# Patient Record
Sex: FEMALE | Race: WHITE
[De-identification: names, ages, dates, MRNs, and addresses within clinical notes are randomized per-mention and may not be internally consistent; named-entity substitution may affect disease eponyms.]

---

## 2018-06-29 ENCOUNTER — HOSPITAL ENCOUNTER (EMERGENCY)
Dept: HOSPITAL 89 - ER | Age: 23
Discharge: HOME | End: 2018-06-29
Payer: COMMERCIAL

## 2018-06-29 VITALS — SYSTOLIC BLOOD PRESSURE: 117 MMHG | DIASTOLIC BLOOD PRESSURE: 86 MMHG

## 2018-06-29 DIAGNOSIS — M54.12: Primary | ICD-10-CM

## 2018-06-29 PROCEDURE — 82947 ASSAY GLUCOSE BLOOD QUANT: CPT

## 2018-06-29 PROCEDURE — 82310 ASSAY OF CALCIUM: CPT

## 2018-06-29 PROCEDURE — 84295 ASSAY OF SERUM SODIUM: CPT

## 2018-06-29 PROCEDURE — 84132 ASSAY OF SERUM POTASSIUM: CPT

## 2018-06-29 PROCEDURE — 99283 EMERGENCY DEPT VISIT LOW MDM: CPT

## 2018-06-29 PROCEDURE — 84520 ASSAY OF UREA NITROGEN: CPT

## 2018-06-29 PROCEDURE — 82565 ASSAY OF CREATININE: CPT

## 2018-06-29 PROCEDURE — 72040 X-RAY EXAM NECK SPINE 2-3 VW: CPT

## 2018-06-29 PROCEDURE — 82374 ASSAY BLOOD CARBON DIOXIDE: CPT

## 2018-06-29 PROCEDURE — 85379 FIBRIN DEGRADATION QUANT: CPT

## 2018-06-29 PROCEDURE — 82435 ASSAY OF BLOOD CHLORIDE: CPT

## 2018-06-29 PROCEDURE — 36415 COLL VENOUS BLD VENIPUNCTURE: CPT

## 2018-06-29 NOTE — ER REPORT
History and Physical


Time Seen By MD:  15:44


Hx. of Stated Complaint:  


NUMBNESS ALL OVER, MOSTLY RIGHT SIDE


HPI/ROS


CHIEF COMPLAINT: r arm numbness and neck pain





HISTORY OF PRESENT ILLNESS: Pt woke up with pain in her neck a few days ago.  

Pt soon after started with r arm numbness and tingling.  Pt denies any 

weakness. Has had an occasional headache but not currently. no hx of migraines 

for self but there is a family hx with mom.  Pt used motrin with minimal 

relief. spoke to her mom who was concerned about possible blood clot in the arm 

causing the symptoms since family hx of blood clots.  Pt has had no trauma. pt 

has no arm swelling.  pt has full range of motion.  came in to be checked for 

possible blood clot. no cp or sob. no leg weakness or numbness





REVIEW OF SYSTEMS:


Constitutional: No fever, no chills.


Eyes: No discharge.


ENT: No sore throat. 


Cardiovascular: No chest pain, no palpitations.


Respiratory: No cough, no shortness of breath.


Gastrointestinal: No abdominal pain, no vomiting.


Genitourinary: No hematuria.


Musculoskeletal: No back pain, + neck pain


Skin: No rashes.


Neurological: occasional headache, + r arm tingling


Allergies:  


Coded Allergies:  


     No Known Drug Allergies (Unverified , 6/29/18)


Home Meds


No Active Prescriptions or Reported Meds


Past Medical/Surgical History


Pmhx and pshx denies


Reviewed Nurses Notes:  Yes


Old Medical Records Reviewed:  Yes


Hx Smoking:  No


Hx Substance Use Disorder:  No


Hx Alcohol Use:  No


Constitutional





Vital Sign - Last 24 Hours








 6/29/18 6/29/18 6/29/18 6/29/18





 15:49 15:50 15:56 16:00


 


Temp  97.1  


 


Pulse  81 79 


 


Resp  14 12 


 


B/P (MAP) 149/102 (118) 149/102  123/75 (91)


 


Pulse Ox  100  


 


O2 Delivery  Room Air  


 


    





 6/29/18 6/29/18 6/29/18 6/29/18





 16:11 16:26 16:30 16:39


 


Pulse ??? 70  


 


Resp  10  


 


B/P (MAP)   118/88 (98) 117/86 (96)


 


Pulse Ox  97  








Physical Exam


General Appearance: The patient is alert, has no immediate need for airway 

protection and no signs of toxicity.


Eyes: Pupils equal and round no pallor or injection, EOMI


ENT:  no pharyngeal erythema or exudates, Mucous membranes are moist


Respiratory: There are no retractions, lungs are clear to auscultation.


Cardiovascular: Regular rate and rhythm. pulses are equal and symmetrical


Gastrointestinal:  Abdomen is soft and non tender, no masses, bowel sounds 

normal, no guarding, no rigidity or rebound


Neurological: Cranial nerves II-XII grossly intact, no sensory or motor loss, 

no dysmetria, no pronator drift, nl gait


Skin: Warm and dry, no rashes.


Musculoskeletal: Neck is supple with r lateral tenderness,


Extremities are nontender, non swollen and have full range of motion.








DIFFERENTIAL DIAGNOSIS: After history and physical exam differential diagnosis 

was considered for cervical radiculopathy, electrolyte abnl , atypical migraine

, herniated disc





Medical Decision Making


Data Points


Result Diagram:  


6/29/18 1608





Laboratory





Hematology








Test


  6/29/18


16:08


 


D-Dimer Quantitative (PE/DVT)


  < 0.27 ug/ml


(0-0.50)


 


Sodium Level


  140 mmol/L


(137-145)


 


Potassium Level


  4.1 mmol/L


(3.5-5.0)


 


Chloride Level


  103 mmol/L


()


 


Carbon Dioxide Level


  26 mmol/L


(22-31)


 


Blood Urea Nitrogen


  12 mg/dl


(7-18)


 


Creatinine


  0.80 mg/dl


(0.52-1.04)


 


Glomerular Filtration Rate


Calc > 60.0 


 


 


Random Glucose


  86 mg/dl


()


 


Calcium Level


  9.1 mg/dl


(8.4-10.2)








Chemistry








Test


  6/29/18


16:08


 


D-Dimer Quantitative (PE/DVT)


  < 0.27 ug/ml


(0-0.50)


 


Glomerular Filtration Rate


Calc > 60.0 


 


 


Calcium Level


  9.1 mg/dl


(8.4-10.2)








Coagulation








Test


  6/29/18


16:08


 


D-Dimer Quantitative (PE/DVT) < 0.27 ug/ml 











EKG/Imaging


Imaging


loss of curvature and mild degenerative changes C7-T1 otherwise no acute fx





ED Course/Re-evaluation


ED Course


check electrolytes due to numbness, d-dimer for low likely horan of clot and c 

spine.





06/29/2018 4:41:40 pm labs stable. will d/c to home with muscle relaxant. if 

symptoms continue may require mri to look for herniated dics.


Decision to Disposition Date:  Jun 29, 2018


Decision to Disposition Time:  16:42





Depart


Departure


Latest Vital Signs





Vital Signs








  Date Time  Temp Pulse Resp B/P (MAP) Pulse Ox O2 Delivery O2 Flow Rate FiO2


 


6/29/18 16:39    117/86 (96)    


 


6/29/18 16:26  70 10  97   


 


6/29/18 15:50 97.1     Room Air  








Impression:  


 Primary Impression:  


 Cervical radiculopathy


Condition:  Improved


Disposition:  HOME OR SELF-CARE


Referrals:  


[facility]


5 Days


New Scripts


Methocarbamol (ROBAXIN-750) 750 Mg Tablet


750 MG PO Q4-6H Y for MUSCLE SPASMS, #21 TAB


   Prov: MEHDI CONNOR DO         6/29/18


Patient Instructions:  Cervical Radiculopathy (GEN)





Additional Instructions:  


Your symptoms are suggestive a nerve irritation.  The nerves in your neck 

innervate you arm. 


Recommend:


 motrin (advil, ibuprofen) 600mg every 6 hours as needed for pain 


Robaxin one every 4-6 hours to help with neck stiffness/spasms.





If symptoms do not improve in the next 5 days then recommend follow up with 

your family doctor or orthopedics for further evaluation. You may require MRI 

of cervical spine if not improving.











MEHDI CONNOR DO Jun 29, 2018 16:04